# Patient Record
Sex: FEMALE | Race: WHITE
[De-identification: names, ages, dates, MRNs, and addresses within clinical notes are randomized per-mention and may not be internally consistent; named-entity substitution may affect disease eponyms.]

---

## 2019-04-05 ENCOUNTER — HOSPITAL ENCOUNTER (EMERGENCY)
Dept: HOSPITAL 25 - ED | Age: 29
LOS: 1 days | Discharge: HOME | End: 2019-04-06
Payer: COMMERCIAL

## 2019-04-05 DIAGNOSIS — Z88.0: ICD-10-CM

## 2019-04-05 DIAGNOSIS — R10.84: ICD-10-CM

## 2019-04-05 DIAGNOSIS — R11.0: ICD-10-CM

## 2019-04-05 DIAGNOSIS — R50.9: ICD-10-CM

## 2019-04-05 DIAGNOSIS — N12: Primary | ICD-10-CM

## 2019-04-05 PROCEDURE — 87086 URINE CULTURE/COLONY COUNT: CPT

## 2019-04-05 PROCEDURE — 81015 MICROSCOPIC EXAM OF URINE: CPT

## 2019-04-05 PROCEDURE — 36415 COLL VENOUS BLD VENIPUNCTURE: CPT

## 2019-04-05 PROCEDURE — 87186 SC STD MICRODIL/AGAR DIL: CPT

## 2019-04-05 PROCEDURE — 87077 CULTURE AEROBIC IDENTIFY: CPT

## 2019-04-05 PROCEDURE — 87040 BLOOD CULTURE FOR BACTERIA: CPT

## 2019-04-05 PROCEDURE — 83605 ASSAY OF LACTIC ACID: CPT

## 2019-04-05 PROCEDURE — 85025 COMPLETE CBC W/AUTO DIFF WBC: CPT

## 2019-04-05 PROCEDURE — 96374 THER/PROPH/DIAG INJ IV PUSH: CPT

## 2019-04-05 PROCEDURE — 84484 ASSAY OF TROPONIN QUANT: CPT

## 2019-04-05 PROCEDURE — 81003 URINALYSIS AUTO W/O SCOPE: CPT

## 2019-04-05 PROCEDURE — 80053 COMPREHEN METABOLIC PANEL: CPT

## 2019-04-05 PROCEDURE — 99282 EMERGENCY DEPT VISIT SF MDM: CPT

## 2019-04-05 NOTE — ED
Abdominal Pain/Female





- HPI Summary


HPI Summary: 





A 29 y/o female presents to [facility] with a chief complaint of left sided flank 

pain today. She also reports a fever of 101.4 at home. She claims that her pain 

is sharp, constant, and mostly in her back and side, not in the front. At 

triage she rated her pain as a 7/10 in severity. She also reports nausea. She 

denies any vomiting or urinary symptoms although she notes that she may be 

dehydrated due to her dark urine color. Her LNMP was two weeks ago. She is on 

birth control. She denies a Hx of prior UTIs, HTN or DM.





- History of Current Complaint


Chief Complaint: EDFlankPain


Stated Complaint: SHARP LEFT FLANK PAIN, GROWING A FEVER PER PT


Time Seen by Provider: 04/05/19 23:31


Hx Obtained From: Patient, Family/Caretaker


Hx Last Menstrual Period: 03/22/19


Onset/Duration: Sudden Onset, Lasting Hours, Still Present


Timing: Constant


Severity Initially: Severe


Severity Currently: Severe


Pain Intensity: 7


Pain Scale Used: 0-10 Numeric


Location: Flank - left


Radiates: No


Character: Sharp


Aggravating Factor(s): Nothing


Alleviating Factor(s): Nothing


Associated Signs and Symptoms: Positive: Fever, Nausea.  Negative: Urinary 

Symptoms, Vomiting


Allergies/Adverse Reactions: 


 Allergies











Allergy/AdvReac Type Severity Reaction Status Date / Time


 


amoxicillin Allergy  Hives Verified 04/05/19 23:40


 


azithromycin Allergy  Hives Verified 04/05/19 23:40














PMH/Surg Hx/FS Hx/Imm Hx


Endocrine/Hematology History: 


   Denies: Hx Diabetes


Cardiovascular History: 


   Denies: Hx Hypertension


Respiratory History: Reports: Hx Asthma


Infectious Disease History: No


Infectious Disease History: 


   Denies: Traveled Outside the [place] in Last 30 Days





- Family History


Known Family History: 


   Negative: Blood Disorder





- Social History


Substance Use Type: Reports: None


Hx Tobacco Use: No





Review of Systems


Positive: Fever


Positive: Abdominal Pain, Nausea.  Negative: Vomiting


Positive: no symptoms reported


All Other Systems Reviewed And Are Negative: Yes





Physical Exam





- Summary


Physical Exam Summary: 





Appearance: Well-appearing, Well-nourished, lying in bed comfortably, 

tachycardia is noted with a HR of 130


Skin: Warm, dry, no obvious rash


Eyes: sclera anicteric, no conjunctival pallor


ENT: mucous membranes moist, pharynx appears normal


Neck: Supple, nontender


Respiratory: Clear to auscultation, no signs of respiratory distress


Cardiovascular: Normal S1, S2. No murmurs. Normal distal pulses in tibial and 

radial bilaterally.


Abdomen: Soft, nontender, normal active bowel sounds present


Musculoskeletal: Left sided CVA tenderness. Normal, Strength/ROM Intact


Neurological: A&Ox3, awake and alert, mentation is normal, speech is fluent and 

appropriate


Psychiatric: affect is normal, does not appear anxious or depressed


Triage Information Reviewed: Yes


Vital Signs On Initial Exam: 


 Initial Vitals











Temp Pulse Resp BP Pulse Ox


 


 100.7 F   128   20   125/86   99 


 


 04/05/19 23:38  04/05/19 23:38  04/05/19 23:38  04/05/19 23:38  04/05/19 23:38











Vital Signs Reviewed: Yes





Diagnostics





- Vital Signs


 Vital Signs











  Temp Pulse Resp BP Pulse Ox


 


 04/05/19 23:38  100.7 F  128  20  125/86  99














- Laboratory


Result Diagrams: 


 04/06/19 00:00





 04/06/19 00:00


Lab Statement: Any lab studies that have been ordered have been reviewed, and 

results considered in the medical decision making process.





Abdominal Pain Fem Course/Dx





- Course


Course Of Treatment: A 29 y/o female presents to [facility] with a chief complaint 

of left sided flank pain today. She also reports a fever of 101.4 at home. The 

physical exam revealed left sided CVA tenderness and that she was tachycardic 

with a heartrate of 130. Bloodwork, chemistries and urines obtained. Trace 

urine ketones, urine blood 1+, Ur Leukocyte Esterase 1+, Urine WBC 2+, Urine 

RBC 1+, Ur Squamous Epith Cells Present. In the ED course the patient was given 

Tylenol PO and Sodium Chloride IV. The patient will be discharged with a 

prescription for Bactrim. The patient is agreeable with this plan.





- Diagnoses


Provider Diagnoses: 


 Pyelonephritis








Discharge





- Sign-Out/Discharge


Documenting (check all that apply): Patient Departure - DC


Patient Received Moderate/Deep Sedation with Procedure: No





- Discharge Plan


Condition: Good


Disposition: HOME


Prescriptions: 


Sulfamethox/Trimethoprim DS* [Bactrim /160 TAB*] 1 tab PO BID #20 tab


Patient Education Materials:  Kidney Infection (ED)


Referrals: 


Renetta Bowser NP [Primary Care Provider] - 


Additional Instructions: 


It may take through the weekend for the fever and pain to helena. If you are 

feeling much worse, you should return.





- Billing Disposition and Condition


Condition: GOOD


Disposition: Home





- Attestation Statements


Document Initiated by Charisse: Yes


Documenting Scribe: Lm Hester


Provider For Whom Charisse is Documenting (Include Credential): Kolton Oakes MD


Scribe Attestation: 


I, Lm Hester, scribed for Kolton Oakes MD on 04/06/19 at 0643. 


Scribe Documentation Reviewed: Yes


Provider Attestation: 


The documentation as recorded by the Lm stanley accurately 

reflects the service I personally performed and the decisions made by me, 

Kolton Oakes MD


Status of Scribe Document: Viewed

## 2019-04-06 VITALS — SYSTOLIC BLOOD PRESSURE: 129 MMHG | DIASTOLIC BLOOD PRESSURE: 77 MMHG

## 2019-04-06 LAB
ALBUMIN SERPL BCG-MCNC: 4.2 G/DL (ref 3.2–5.2)
ALBUMIN/GLOB SERPL: 1.6 {RATIO} (ref 1–3)
ALP SERPL-CCNC: 58 U/L (ref 34–104)
ALT SERPL W P-5'-P-CCNC: 18 U/L (ref 7–52)
ANION GAP SERPL CALC-SCNC: 6 MMOL/L (ref 2–11)
AST SERPL-CCNC: 15 U/L (ref 13–39)
BASOPHILS # BLD AUTO: 0 10^3/UL (ref 0–0.2)
BUN SERPL-MCNC: 10 MG/DL (ref 6–24)
BUN/CREAT SERPL: 12.3 (ref 8–20)
CALCIUM SERPL-MCNC: 8.8 MG/DL (ref 8.6–10.3)
CHLORIDE SERPL-SCNC: 107 MMOL/L (ref 101–111)
EOSINOPHIL # BLD AUTO: 0.1 10^3/UL (ref 0–0.6)
GLOBULIN SER CALC-MCNC: 2.7 G/DL (ref 2–4)
GLUCOSE SERPL-MCNC: 118 MG/DL (ref 70–100)
HCO3 SERPL-SCNC: 24 MMOL/L (ref 22–32)
HCT VFR BLD AUTO: 35 % (ref 33–41)
HGB BLD-MCNC: 12.4 G/DL (ref 12–16)
LYMPHOCYTES # BLD AUTO: 1.5 10^3/UL (ref 1–4.8)
MCH RBC QN AUTO: 31 PG (ref 27–31)
MCHC RBC AUTO-ENTMCNC: 36 G/DL (ref 31–36)
MCV RBC AUTO: 87 FL (ref 80–97)
MONOCYTES # BLD AUTO: 0.5 10^3/UL (ref 0–0.8)
NEUTROPHILS # BLD AUTO: 6.7 10^3/UL (ref 1.5–7.7)
NRBC # BLD AUTO: 0 10^3/UL
NRBC BLD QL AUTO: 0
PLATELET # BLD AUTO: 216 10^3/UL (ref 150–450)
POTASSIUM SERPL-SCNC: 3.6 MMOL/L (ref 3.5–5)
PROT SERPL-MCNC: 6.9 G/DL (ref 6.4–8.9)
RBC # BLD AUTO: 4.03 10^6 /UL (ref 3.7–4.87)
RBC UR QL AUTO: (no result)
SODIUM SERPL-SCNC: 137 MMOL/L (ref 135–145)
TROPONIN I SERPL-MCNC: 0 NG/ML (ref ?–0.04)
WBC # BLD AUTO: 8.9 10^3/UL (ref 3.5–10.8)
WBC UR QL AUTO: (no result)

## 2019-04-08 NOTE — PN
Progress Note





- Progress Note


Date of Service: 04/04/19


Note: 


Pt. started on bactrim 4/4 for pyelonephritis.  Urine culture today is growing 

ESBL Escherichia coli resistant to Bactrim.  Patient with allergies to 

azithromycin and amoxicillin.  Only option based on culture and allergies is 

Macrobid.  I spoke with patient today at 1140 discuss results.  She states she 

is feeling a bit better and fever has resolved but is still having some flank 

pain.  Prescription sent to pharmacy.  Advised patient to  prescription 

as soon as possible and start today.  Can DC Bactrim.  Follow-up with PCP.  

Patient understands and agrees with plan.

## 2019-11-03 ENCOUNTER — HOSPITAL ENCOUNTER (EMERGENCY)
Dept: HOSPITAL 25 - ED | Age: 29
Discharge: HOME | End: 2019-11-03
Payer: COMMERCIAL
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
## 2019-11-03 DIAGNOSIS — S61.210A: Primary | ICD-10-CM
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
## 2019-11-03 DIAGNOSIS — Z88.1: ICD-10-CM

## 2019-11-03 DIAGNOSIS — Z88.0: ICD-10-CM

## 2019-11-03 PROCEDURE — 99281 EMR DPT VST MAYX REQ PHY/QHP: CPT

## 2019-11-03 NOTE — ED
Laceration/Wound HPI





- HPI Summary


HPI Summary: 


28-year-old female presents with right index finger laceration.  She cut it on 

a cheese grater.  States that part of the tip of her finger is missing.  She 

has full range of motion of finger.  She states she has a burning type pain.  

Tetanus up-to-date.  Has no medical conditions.  





- History of Current Complaint


Stated Complaint: FINGER LAC PER PT


Time Seen by Provider: 11/03/19 20:54


Hx Last Menstrual Period: 03/22/19


Pain Intensity: 1





- Allergy/Home Medications


Allergies/Adverse Reactions: 


 Allergies











Allergy/AdvReac Type Severity Reaction Status Date / Time


 


amoxicillin Allergy  Hives Verified 11/03/19 20:56


 


azithromycin Allergy  Hives Verified 11/03/19 20:56














PMH/Surg Hx/FS Hx/Imm Hx


Endocrine/Hematology History: 


   Denies: Hx Diabetes


Cardiovascular History: 


   Denies: Hx Hypertension


Respiratory History: Reports: Hx Asthma


Infectious Disease History: No


Infectious Disease History: 


   Denies: Traveled Outside the [place] in Last 30 Days





- Family History


Known Family History: 


   Negative: Blood Disorder





- Social History


Alcohol Use: Occasionally


Substance Use Type: Reports: None


Hx Tobacco Use: No


Smoking Status (MU): Never Smoked Tobacco





Review of Systems


Negative: Fever


Negative: Chest Pain


Negative: Shortness Of Breath


Positive: Other - laceration right index finger


All Other Systems Reviewed And Are Negative: Yes





Physical Exam


Triage Information Reviewed: Yes


Vital Signs On Initial Exam: 


 Initial Vitals











Temp Pulse Resp BP Pulse Ox


 


 99.0 F   83   18   133/93   98 


 


 11/03/19 20:52  11/03/19 20:52  11/03/19 20:52  11/03/19 20:52  11/03/19 20:52











Vital Signs Reviewed: Yes


Appearance: Positive: Well-Appearing


Skin: Positive: Warm, Dry, Other - 1/2cm by 1/2cm skin avulsion distal phalanx 

of right index finger


Head/Face: Positive: Normal Head/Face Inspection


Eyes: Positive: Normal, Conjunctiva Clear


ENT: Positive: Pharynx normal


Respiratory/Lung Sounds: Positive: Clear to Auscultation, Breath Sounds Present


Cardiovascular: Positive: Normal, RRR


Musculoskeletal: Positive: Strength/ROM Intact, Other - capillary refill<2secs


Neurological: Positive: Normal


Psychiatric: Positive: Normal





Procedures





- Sedation


Patient Received Moderate/Deep Sedation with Procedure: No





- Laceration/Wound Repair


  ** 1


Location: Other - right index finger


Length, Depth and Shape: 1/2cm by 1/2cm avulsion


Irrigated w/ Saline (ccs): 300


Sterile Dressing Applied?: Yes - surgical, xeroform, guaze, coband





Diagnostics





- Vital Signs


 Vital Signs











  Temp Pulse Resp BP Pulse Ox


 


 11/03/19 20:52  99.0 F  83  18  133/93  98














- Laboratory


Lab Statement: Any lab studies that have been ordered have been reviewed, and 

results considered in the medical decision making process.





Laceration Repair Course/Dx





- Course


Course Of Treatment: 28-year-old female presents with right index finger 

laceration.  She cut it on a cheese grater.  States that part of the tip of her 

finger is missing.  She has full range of motion of finger.  She states she has 

a burning type pain.  Tetanus up-to-date.  Has no medical conditions.  On exam 

half centimeter by half centimeter skin avulsion of the distal phalanx of right 

index finger.  Cleaned area and placed Surgicel Xeroform and Coband.  No repeat 

bleeding.  Told keep dressing on area.  Told to change the dressing.  Patient 

understands and agrees with plan.





- Differential Dx


Differental Diagnoses: Abrasion, Avulsion, Laceration





- Clinical Impression


Provider Diagnoses: 


 Skin avulsion








Discharge ED





- Sign-Out/Discharge


Documenting (check all that apply): Patient Departure





- Discharge Plan


Condition: Good


Disposition: HOME


Patient Education Materials:  Skin Avulsion (ED)


Referrals: 


Renetta Bowser NP [Primary Care Provider] - 


Additional Instructions: 


Keep area in pressure dressing for 48 hours, after48 hours check for sign of 

infection leaving absorbable hemostat on wound and rewrap with pressure 

dressing for another 24 hours


Keep area covered


Follow up with primary within 5 days


Return to ED if develop any signs of infection such as fever, spreading redness

, or pus formation or if bleed through pressure dressing or any new or 

worsening symptoms





- Billing Disposition and Condition


Condition: GOOD


Disposition: Home





- Attestation Statements


Provider Attestation: 





I was available for consult. This patient was seen by the DEBBIE. The patient was 

not presented to, seen by, or examined by me. Stefano Reno MD